# Patient Record
Sex: MALE | Race: WHITE | NOT HISPANIC OR LATINO | Employment: PART TIME | URBAN - METROPOLITAN AREA
[De-identification: names, ages, dates, MRNs, and addresses within clinical notes are randomized per-mention and may not be internally consistent; named-entity substitution may affect disease eponyms.]

---

## 2017-08-09 ENCOUNTER — OFFICE VISIT (OUTPATIENT)
Dept: FAMILY MEDICINE | Facility: CLINIC | Age: 22
End: 2017-08-09
Payer: COMMERCIAL

## 2017-08-09 ENCOUNTER — LAB VISIT (OUTPATIENT)
Dept: LAB | Facility: HOSPITAL | Age: 22
End: 2017-08-09
Attending: INTERNAL MEDICINE
Payer: COMMERCIAL

## 2017-08-09 VITALS
SYSTOLIC BLOOD PRESSURE: 110 MMHG | HEART RATE: 61 BPM | WEIGHT: 226.06 LBS | TEMPERATURE: 97 F | OXYGEN SATURATION: 98 % | BODY MASS INDEX: 29.96 KG/M2 | DIASTOLIC BLOOD PRESSURE: 80 MMHG | HEIGHT: 73 IN

## 2017-08-09 DIAGNOSIS — R82.998 DARK URINE: ICD-10-CM

## 2017-08-09 DIAGNOSIS — J02.9 ACUTE PHARYNGITIS, UNSPECIFIED ETIOLOGY: Primary | ICD-10-CM

## 2017-08-09 LAB
BASOPHILS # BLD AUTO: 0.03 K/UL
BASOPHILS NFR BLD: 0.4 %
DIFFERENTIAL METHOD: NORMAL
EOSINOPHIL # BLD AUTO: 0.3 K/UL
EOSINOPHIL NFR BLD: 3.6 %
ERYTHROCYTE [DISTWIDTH] IN BLOOD BY AUTOMATED COUNT: 12.6 %
HCT VFR BLD AUTO: 45.5 %
HGB BLD-MCNC: 15.9 G/DL
LYMPHOCYTES # BLD AUTO: 2.3 K/UL
LYMPHOCYTES NFR BLD: 29.1 %
MCH RBC QN AUTO: 30.2 PG
MCHC RBC AUTO-ENTMCNC: 34.9 G/DL
MCV RBC AUTO: 87 FL
MONOCYTES # BLD AUTO: 0.9 K/UL
MONOCYTES NFR BLD: 11.8 %
NEUTROPHILS # BLD AUTO: 4.4 K/UL
NEUTROPHILS NFR BLD: 55 %
PLATELET # BLD AUTO: 242 K/UL
PMV BLD AUTO: 10.7 FL
RBC # BLD AUTO: 5.26 M/UL
WBC # BLD AUTO: 7.96 K/UL

## 2017-08-09 PROCEDURE — 3008F BODY MASS INDEX DOCD: CPT | Mod: S$GLB,,, | Performed by: FAMILY MEDICINE

## 2017-08-09 PROCEDURE — 85025 COMPLETE CBC W/AUTO DIFF WBC: CPT

## 2017-08-09 PROCEDURE — 99213 OFFICE O/P EST LOW 20 MIN: CPT | Mod: S$GLB,,, | Performed by: FAMILY MEDICINE

## 2017-08-09 PROCEDURE — 36415 COLL VENOUS BLD VENIPUNCTURE: CPT | Mod: PO

## 2017-08-09 PROCEDURE — 99999 PR PBB SHADOW E&M-EST. PATIENT-LVL III: CPT | Mod: PBBFAC,,, | Performed by: FAMILY MEDICINE

## 2017-08-09 RX ORDER — FLUTICASONE PROPIONATE 50 MCG
SPRAY, SUSPENSION (ML) NASAL
Qty: 16 G | Refills: 5 | Status: SHIPPED | OUTPATIENT
Start: 2017-08-09

## 2017-08-16 ENCOUNTER — OFFICE VISIT (OUTPATIENT)
Dept: INTERNAL MEDICINE | Facility: CLINIC | Age: 22
End: 2017-08-16
Payer: COMMERCIAL

## 2017-08-16 ENCOUNTER — LAB VISIT (OUTPATIENT)
Dept: LAB | Facility: HOSPITAL | Age: 22
End: 2017-08-16
Attending: INTERNAL MEDICINE
Payer: COMMERCIAL

## 2017-08-16 VITALS
DIASTOLIC BLOOD PRESSURE: 86 MMHG | OXYGEN SATURATION: 96 % | HEART RATE: 70 BPM | BODY MASS INDEX: 30.51 KG/M2 | SYSTOLIC BLOOD PRESSURE: 118 MMHG | TEMPERATURE: 97 F | WEIGHT: 230.19 LBS | HEIGHT: 73 IN

## 2017-08-16 DIAGNOSIS — Z00.00 ROUTINE GENERAL MEDICAL EXAMINATION AT A HEALTH CARE FACILITY: Primary | ICD-10-CM

## 2017-08-16 DIAGNOSIS — Z00.00 ROUTINE GENERAL MEDICAL EXAMINATION AT A HEALTH CARE FACILITY: ICD-10-CM

## 2017-08-16 LAB
ALBUMIN SERPL BCP-MCNC: 4.2 G/DL
ALP SERPL-CCNC: 73 U/L
ALT SERPL W/O P-5'-P-CCNC: 51 U/L
ANION GAP SERPL CALC-SCNC: 11 MMOL/L
AST SERPL-CCNC: 42 U/L
BILIRUB SERPL-MCNC: 1 MG/DL
BILIRUB UR QL STRIP: NEGATIVE
BUN SERPL-MCNC: 18 MG/DL
CALCIUM SERPL-MCNC: 9.6 MG/DL
CHLORIDE SERPL-SCNC: 102 MMOL/L
CHOLEST/HDLC SERPL: 2.6 {RATIO}
CLARITY UR REFRACT.AUTO: CLEAR
CO2 SERPL-SCNC: 28 MMOL/L
COLOR UR AUTO: YELLOW
CREAT SERPL-MCNC: 1.1 MG/DL
EST. GFR  (AFRICAN AMERICAN): >60 ML/MIN/1.73 M^2
EST. GFR  (NON AFRICAN AMERICAN): >60 ML/MIN/1.73 M^2
GLUCOSE SERPL-MCNC: 69 MG/DL
GLUCOSE UR QL STRIP: NEGATIVE
HDL/CHOLESTEROL RATIO: 37.8 %
HDLC SERPL-MCNC: 156 MG/DL
HDLC SERPL-MCNC: 59 MG/DL
HGB UR QL STRIP: NEGATIVE
KETONES UR QL STRIP: NEGATIVE
LDLC SERPL CALC-MCNC: 73.4 MG/DL
LEUKOCYTE ESTERASE UR QL STRIP: NEGATIVE
NITRITE UR QL STRIP: NEGATIVE
NONHDLC SERPL-MCNC: 97 MG/DL
PH UR STRIP: 5 [PH] (ref 5–8)
POTASSIUM SERPL-SCNC: 4.6 MMOL/L
PROT SERPL-MCNC: 7.7 G/DL
PROT UR QL STRIP: NEGATIVE
SODIUM SERPL-SCNC: 141 MMOL/L
SP GR UR STRIP: 1.02 (ref 1–1.03)
TRIGL SERPL-MCNC: 118 MG/DL
TSH SERPL DL<=0.005 MIU/L-ACNC: 1 UIU/ML
URN SPEC COLLECT METH UR: NORMAL
UROBILINOGEN UR STRIP-ACNC: NEGATIVE EU/DL

## 2017-08-16 PROCEDURE — 84443 ASSAY THYROID STIM HORMONE: CPT

## 2017-08-16 PROCEDURE — 36415 COLL VENOUS BLD VENIPUNCTURE: CPT | Mod: PO

## 2017-08-16 PROCEDURE — 80061 LIPID PANEL: CPT

## 2017-08-16 PROCEDURE — 99999 PR PBB SHADOW E&M-EST. PATIENT-LVL III: CPT | Mod: PBBFAC,,, | Performed by: INTERNAL MEDICINE

## 2017-08-16 PROCEDURE — 81003 URINALYSIS AUTO W/O SCOPE: CPT

## 2017-08-16 PROCEDURE — 80053 COMPREHEN METABOLIC PANEL: CPT

## 2017-08-16 PROCEDURE — 99395 PREV VISIT EST AGE 18-39: CPT | Mod: S$GLB,,, | Performed by: INTERNAL MEDICINE

## 2017-08-16 NOTE — PROGRESS NOTES
"HPI:  Patient is a 22-year-old man who comes in today for his initial visit myself to establish care.  1 year ago he was seen for dark-colored urine any urinalysis showed increased protein and bilirubin.  Subsequent lab work was all normal.  He would like to have all that repeated.  He's feeling fine.  He has no complaints at all.      Current MEDS: medcard review, verified and update  Allergies: Per the electronic medical record    Past Medical History:   Diagnosis Date    Asthma     Eustachian tube dysfunction 7/12/2013       No past surgical history on file.    SHx: per the electronic medical record    FHx: recorded in the electronic medical record    ROS:    denies any chest pains or shortness of breath. Denies any nausea, vomiting or diarrhea. Denies any fever, chills or sweats. Denies any change in weight, voice, stool, skin or hair. Denies any dysuria, dyspepsia or dysphagia. Denies any change in vision, hearing or headaches. Denies any swollen lymph nodes or loss of memory.    PE:  /86   Pulse 70   Temp 96.8 °F (36 °C) (Tympanic)   Ht 6' 1" (1.854 m)   Wt 104.4 kg (230 lb 2.6 oz)   SpO2 96%   BMI 30.37 kg/m²   Gen: Well-developed, well-nourished, male, in no acute distress, oriented x3  HEENT: neck is supple, no adenopathy, carotids 2+ equal without bruits, thyroid exam normal size without nodules.  CHEST: clear to auscultation and percussion  CVS: regular rate and rhythm without significant murmur, gallop, or rubs  ABD: soft, benign, no rebound no guarding, no distention.  Bowel sounds are normal.     nontender.  No palpable masses.  No organomegaly and no audible bruits.  RECTAL: Deferred  EXT: no clubbing, cyanosis, or edema  LYMPH: no cervical, inguinal, or axillary adenopathy  FEET: no loss of sensation.  No ulcers or pressure sores.  NEURO: gait normal.  Cranial nerves II- XII intact. No nystagmus.  Speech normal.   Gross motor and sensory unremarkable.    Lab Results   Component Value " Date    WBC 7.96 08/09/2017    HGB 15.9 08/09/2017    HCT 45.5 08/09/2017     08/09/2017    CHOL 111 (L) 09/09/2016    TRIG 81 09/09/2016    HDL 40 09/09/2016    ALT 26 09/09/2016    AST 27 09/09/2016     09/09/2016    K 4.5 09/09/2016     09/09/2016    CREATININE 1.2 09/09/2016    BUN 9 09/09/2016    CO2 29 09/09/2016    TSH 0.619 09/09/2016    HGBA1C 4.8 09/09/2016       Impression:  Healthy appearing 22-year-old male  Patient Active Problem List   Diagnosis    Eustachian tube dysfunction    Proteinuria       Plan:   Orders Placed This Encounter    Comprehensive metabolic panel    Lipid panel    TSH    Urinalysis     He will have the above lab work and urine test today.  We will call him with results.

## 2017-08-18 ENCOUNTER — TELEPHONE (OUTPATIENT)
Dept: INTERNAL MEDICINE | Facility: CLINIC | Age: 22
End: 2017-08-18

## 2017-08-18 DIAGNOSIS — R79.89 ABNORMAL LFTS: Primary | ICD-10-CM

## 2017-08-18 NOTE — TELEPHONE ENCOUNTER
Your urine test was completely normal but your lab work shows very mild elevation in two of your liver blood test. I want you to have some additional lab work and an ultrasound of your liver.     Orders in/see me five days later

## 2017-08-23 ENCOUNTER — TELEPHONE (OUTPATIENT)
Dept: INTERNAL MEDICINE | Facility: CLINIC | Age: 22
End: 2017-08-23

## 2017-08-23 NOTE — TELEPHONE ENCOUNTER
Called pt informed him of results and recommendations.  Pt voiced understanding and scheduled lab and ultra sound appointments.  He will call back to schedule follow up when he checks his work schedule.

## 2017-09-14 ENCOUNTER — TELEPHONE (OUTPATIENT)
Dept: RADIOLOGY | Facility: HOSPITAL | Age: 22
End: 2017-09-14

## 2017-09-15 ENCOUNTER — PATIENT MESSAGE (OUTPATIENT)
Dept: INTERNAL MEDICINE | Facility: CLINIC | Age: 22
End: 2017-09-15

## 2017-09-15 ENCOUNTER — HOSPITAL ENCOUNTER (OUTPATIENT)
Dept: RADIOLOGY | Facility: HOSPITAL | Age: 22
Discharge: HOME | End: 2017-09-15
Attending: INTERNAL MEDICINE
Payer: COMMERCIAL

## 2017-09-15 DIAGNOSIS — R79.89 ABNORMAL LFTS: ICD-10-CM

## 2017-09-15 PROCEDURE — 76705 ECHO EXAM OF ABDOMEN: CPT | Mod: 26,,, | Performed by: RADIOLOGY

## 2017-09-15 PROCEDURE — 76705 ECHO EXAM OF ABDOMEN: CPT | Mod: TC,PO

## 2017-12-13 ENCOUNTER — TELEPHONE (OUTPATIENT)
Dept: INTERNAL MEDICINE | Facility: CLINIC | Age: 22
End: 2017-12-13

## 2017-12-14 ENCOUNTER — OFFICE VISIT (OUTPATIENT)
Dept: INTERNAL MEDICINE | Facility: CLINIC | Age: 22
End: 2017-12-14
Payer: COMMERCIAL

## 2017-12-14 ENCOUNTER — PATIENT MESSAGE (OUTPATIENT)
Dept: INTERNAL MEDICINE | Facility: CLINIC | Age: 22
End: 2017-12-14

## 2017-12-14 VITALS
SYSTOLIC BLOOD PRESSURE: 120 MMHG | HEART RATE: 74 BPM | OXYGEN SATURATION: 98 % | HEIGHT: 73 IN | DIASTOLIC BLOOD PRESSURE: 80 MMHG | WEIGHT: 232.38 LBS | BODY MASS INDEX: 30.8 KG/M2 | TEMPERATURE: 97 F

## 2017-12-14 DIAGNOSIS — K76.0 FATTY LIVER: ICD-10-CM

## 2017-12-14 DIAGNOSIS — H60.501 ACUTE OTITIS EXTERNA OF RIGHT EAR, UNSPECIFIED TYPE: Primary | ICD-10-CM

## 2017-12-14 PROCEDURE — 99999 PR PBB SHADOW E&M-EST. PATIENT-LVL III: CPT | Mod: PBBFAC,,, | Performed by: NURSE PRACTITIONER

## 2017-12-14 PROCEDURE — 99213 OFFICE O/P EST LOW 20 MIN: CPT | Mod: S$GLB,,, | Performed by: NURSE PRACTITIONER

## 2017-12-14 RX ORDER — NEOMYCIN SULFATE, POLYMYXIN B SULFATE, HYDROCORTISONE 3.5; 10000; 1 MG/ML; [USP'U]/ML; MG/ML
3 SOLUTION/ DROPS AURICULAR (OTIC) 3 TIMES DAILY
Qty: 10 ML | Refills: 0 | Status: SHIPPED | OUTPATIENT
Start: 2017-12-14 | End: 2018-08-27

## 2017-12-14 NOTE — PROGRESS NOTES
"Subjective:      Patient ID: Thang Ornelas is a 22 y.o. male.    Chief Complaint: Otalgia    HPI:  Patient is here with complaints of right ear pain.  Says he frequently has ear pain/infection requiring ear drops.  He denies any discharge from the ear, no fever, no recent swimming.  Tries to dry ears well after each shower.  Denies any recent cold or illness.   He was also informed from Dr Silva that all of his testing is showing a fatty liver, everything else was found to be normal.  Advised to lose weight, stay on the thin side.    Past Medical History:   Diagnosis Date    Asthma     Eustachian tube dysfunction 7/12/2013       History reviewed. No pertinent surgical history.    Lab Results   Component Value Date    WBC 7.96 08/09/2017    HGB 15.9 08/09/2017    HCT 45.5 08/09/2017     08/09/2017    CHOL 156 08/16/2017    TRIG 118 08/16/2017    HDL 59 08/16/2017    ALT 51 (H) 08/16/2017    AST 42 (H) 08/16/2017     08/16/2017    K 4.6 08/16/2017     08/16/2017    CREATININE 1.1 08/16/2017    BUN 18 08/16/2017    CO2 28 08/16/2017    TSH 0.999 08/16/2017    HGBA1C 4.8 09/09/2016       /80 (BP Location: Left arm, Patient Position: Sitting, BP Method: X-Large (Manual))   Pulse 74   Temp 97.4 °F (36.3 °C) (Tympanic)   Ht 6' 1" (1.854 m)   Wt 105.4 kg (232 lb 5.8 oz)   SpO2 98%   BMI 30.66 kg/m²       Review of Systems   Constitutional: Negative for appetite change, chills, diaphoresis and fever.   HENT: Positive for ear pain. Negative for congestion, postnasal drip, rhinorrhea, sneezing, sore throat and trouble swallowing.    Eyes: Negative for photophobia, pain and visual disturbance.   Respiratory: Negative for apnea, cough, choking, chest tightness, shortness of breath and wheezing.    Cardiovascular: Negative for chest pain, palpitations and leg swelling.   Gastrointestinal: Negative for abdominal pain, constipation, diarrhea, nausea and vomiting.   Genitourinary: Negative for " decreased urine volume, difficulty urinating, dysuria, hematuria and urgency.   Musculoskeletal: Negative for arthralgias, gait problem, joint swelling and myalgias.   Skin: Negative for rash.   Neurological: Negative for dizziness, tremors, seizures, syncope, weakness, light-headedness, numbness and headaches.   Psychiatric/Behavioral: Negative for agitation, confusion, decreased concentration, hallucinations and sleep disturbance. The patient is not nervous/anxious.       Objective:     Physical Exam   Constitutional: He is oriented to person, place, and time. He appears well-developed and well-nourished. No distress.   HENT:   Left TM normal  Right TM minimal redness, mild swelling, no bulging   Musculoskeletal:   Normal gait   Neurological: He is alert and oriented to person, place, and time.   Skin: Skin is warm and dry.   Psychiatric: He has a normal mood and affect. His behavior is normal.     Assessment:      1. Acute otitis externa of right ear, unspecified type    2. Fatty liver      Plan:   Acute otitis externa of right ear, unspecified type    Fatty liver    Other orders  -     neomycin-polymyxin-hydrocortisone (CORTISPORIN) otic solution; Place 3 drops into the right ear 3 (three) times daily.  Dispense: 10 mL; Refill: 0    will use Flonse daily until ear is better.  LMK if not better next week      Current Outpatient Prescriptions:     cetirizine (ZYRTEC) 10 MG tablet, Take 10 mg by mouth daily as needed. , Disp: , Rfl:     fluticasone (FLONASE) 50 mcg/actuation nasal spray, USE 1 SPRAY IN EACH NOSTRIL EVERY DAY, Disp: 16 g, Rfl: 5    neomycin-polymyxin-hydrocortisone (CORTISPORIN) otic solution, Place 3 drops into the right ear 3 (three) times daily., Disp: 10 mL, Rfl: 0

## 2018-08-27 ENCOUNTER — OFFICE VISIT (OUTPATIENT)
Dept: INTERNAL MEDICINE | Facility: CLINIC | Age: 23
End: 2018-08-27
Payer: COMMERCIAL

## 2018-08-27 VITALS
BODY MASS INDEX: 31.44 KG/M2 | HEIGHT: 73 IN | RESPIRATION RATE: 18 BRPM | HEART RATE: 76 BPM | DIASTOLIC BLOOD PRESSURE: 98 MMHG | SYSTOLIC BLOOD PRESSURE: 122 MMHG | TEMPERATURE: 99 F | OXYGEN SATURATION: 99 % | WEIGHT: 237.19 LBS

## 2018-08-27 DIAGNOSIS — R51.9 NONINTRACTABLE HEADACHE, UNSPECIFIED CHRONICITY PATTERN, UNSPECIFIED HEADACHE TYPE: Primary | ICD-10-CM

## 2018-08-27 PROCEDURE — 3008F BODY MASS INDEX DOCD: CPT | Mod: CPTII,S$GLB,, | Performed by: INTERNAL MEDICINE

## 2018-08-27 PROCEDURE — 99999 PR PBB SHADOW E&M-EST. PATIENT-LVL III: CPT | Mod: PBBFAC,,, | Performed by: INTERNAL MEDICINE

## 2018-08-27 PROCEDURE — 99213 OFFICE O/P EST LOW 20 MIN: CPT | Mod: S$GLB,,, | Performed by: INTERNAL MEDICINE

## 2018-08-27 NOTE — PROGRESS NOTES
"HPI:  Patient is a 23-year-old man who comes today with complaints of having headaches for the last week whenever he is working now are having sex.  He does a lot of muscle strength training and body building.  The last week whenever he is exercises and exert himself is when the headache begins.  It is a left-sided throbbing pain.  He takes ibuprofen for it.  It can last for several hours.  He has also had it with intercourse.  Prior to this past week he has had no other problems with it.  He denies any significant exposure to mosquitos.  Patient has had no fever or other symptoms.  He has had no nausea vomiting.    Current meds have been verified and updated per the EMR  Exam:BP (!) 122/98   Pulse 76   Temp 98.7 °F (37.1 °C)   Resp 18   Ht 6' 0.84" (1.85 m)   Wt 107.6 kg (237 lb 3.4 oz)   SpO2 99%   BMI 31.44 kg/m²   His fundi are unremarkable.  Speech is normal.  Gait normal motor and sensory exam is grossly normal.  Neck is supple without adenopathy.    Lab Results   Component Value Date    WBC 7.96 08/09/2017    HGB 15.9 08/09/2017    HCT 45.5 08/09/2017     08/09/2017    CHOL 156 08/16/2017    TRIG 118 08/16/2017    HDL 59 08/16/2017    ALT 51 (H) 08/16/2017    AST 42 (H) 08/16/2017     08/16/2017    K 4.6 08/16/2017     08/16/2017    CREATININE 1.1 08/16/2017    BUN 18 08/16/2017    CO2 28 08/16/2017    TSH 0.999 08/16/2017    HGBA1C 4.8 09/09/2016       Impression:  Headache, only present for the last week.  Highly suspect just tension stress headache.  Elevated blood pressure for the 1st time  Patient Active Problem List   Diagnosis    Eustachian tube dysfunction    Proteinuria       Plan:     I recommended he try taking 4 ibuprofen 2 Tylenol with the onset of the headache.  If he still having headaches after 2 more weeks, he needs to let me know.  He needs to check his blood pressure 3 to 4 times in the next couple weeks in the a let me know if it is more than 130/80.    "

## 2018-10-21 ENCOUNTER — HOSPITAL ENCOUNTER (EMERGENCY)
Facility: HOSPITAL | Age: 23
Discharge: HOME | End: 2018-10-21
Attending: EMERGENCY MEDICINE
Payer: COMMERCIAL

## 2018-10-21 VITALS
BODY MASS INDEX: 30.48 KG/M2 | HEART RATE: 107 BPM | HEIGHT: 73 IN | OXYGEN SATURATION: 98 % | SYSTOLIC BLOOD PRESSURE: 187 MMHG | DIASTOLIC BLOOD PRESSURE: 79 MMHG | TEMPERATURE: 99 F | RESPIRATION RATE: 18 BRPM | WEIGHT: 230 LBS

## 2018-10-21 DIAGNOSIS — S99.922A FOOT INJURY, LEFT, INITIAL ENCOUNTER: ICD-10-CM

## 2018-10-21 DIAGNOSIS — R03.0 ELEVATED BLOOD PRESSURE READING: ICD-10-CM

## 2018-10-21 DIAGNOSIS — S92.345A CLOSED NONDISPLACED FRACTURE OF FOURTH METATARSAL BONE OF LEFT FOOT, INITIAL ENCOUNTER: Primary | ICD-10-CM

## 2018-10-21 PROCEDURE — 99283 EMERGENCY DEPT VISIT LOW MDM: CPT | Mod: 25

## 2018-10-21 PROCEDURE — 29515 APPLICATION SHORT LEG SPLINT: CPT

## 2018-10-21 RX ORDER — HYDROCODONE BITARTRATE AND ACETAMINOPHEN 7.5; 325 MG/1; MG/1
1 TABLET ORAL EVERY 8 HOURS PRN
Qty: 12 TABLET | Refills: 0 | Status: SHIPPED | OUTPATIENT
Start: 2018-10-21

## 2018-10-21 RX ORDER — IBUPROFEN 800 MG/1
800 TABLET ORAL EVERY 6 HOURS PRN
Qty: 20 TABLET | Refills: 0 | Status: SHIPPED | OUTPATIENT
Start: 2018-10-21

## 2018-10-21 NOTE — ED NOTES
Bed:  05  Expected date:   Expected time:   Means of arrival:   Comments:     Leonel Terrazas RN  10/21/18 0216

## 2018-10-21 NOTE — ED PROVIDER NOTES
HISTORY     Chief Complaint   Patient presents with    Foot Injury     Left foot injured playing yard football.  Top of foot is swollen, red, and painful.       Review of patient's allergies indicates:  No Known Allergies     HPI     Foot Injury    Incident location: Tailgating in parking lot. The injury mechanism was compression (Jumping up for football ). The incident occurred yesterday. The pain is present in the left foot. The quality of the pain is described as aching. The pain is at a severity of 7/10. The pain has been fluctuating since onset. Associated symptoms include inability to bear weight. The symptoms are aggravated by bearing weight and palpation. He has tried nothing for the symptoms.        PCP: Primary Doctor No     Past Medical History:  Past Medical History:   Diagnosis Date    Asthma     Eustachian tube dysfunction 7/12/2013        Past Surgical History:  No past surgical history on file.     Family History:  No family history on file.     Social History:  Social History     Tobacco Use    Smoking status: Never Smoker    Smokeless tobacco: Never Used   Substance and Sexual Activity    Alcohol use: Yes     Comment: socially    Drug use: No    Sexual activity: Yes     Partners: Female         ROS   Review of Systems   Constitutional: Negative for fever.   HENT: Negative for sore throat.    Respiratory: Negative for shortness of breath.    Cardiovascular: Negative for chest pain.   Gastrointestinal: Negative for nausea.   Genitourinary: Negative for dysuria.   Musculoskeletal: Negative for back pain.        + L foot pain   Skin: Negative for rash.   Neurological: Negative for weakness.   Hematological: Does not bruise/bleed easily.   All other systems reviewed and are negative.      PHYSICAL EXAM     Initial Vitals [10/21/18 0100]   BP Pulse Resp Temp SpO2   (!) 187/79 107 18 99.1 °F (37.3 °C) 98 %      MAP       --           Physical Exam    Constitutional: He appears well-developed  "and well-nourished. No distress.   HENT:   Head: Normocephalic and atraumatic.   Eyes: Conjunctivae and EOM are normal. Pupils are equal, round, and reactive to light.   Neck: Normal range of motion. Neck supple.   Cardiovascular: Normal rate and regular rhythm.   Pulmonary/Chest: Breath sounds normal. No respiratory distress. He has no wheezes. He has no rales.   Abdominal: Soft. Bowel sounds are normal.   Musculoskeletal: Normal range of motion.        Left foot: There is tenderness, bony tenderness and swelling. There is no deformity.        Feet:    Neurological: He is alert and oriented to person, place, and time.   Skin: Skin is warm and dry. Capillary refill takes less than 2 seconds. No rash noted.   Psychiatric: He has a normal mood and affect.          ED COURSE   Splint Application  Date/Time: 10/21/2018 2:55 AM  Performed by: Douglas Partida Jr., FNP  Authorized by: Smooth Shirley MD   Consent Done: Yes  Consent: Verbal consent obtained.  Consent given by: patient  Location details: left leg  Splint type: short leg  Supplies used: Ortho-Glass and cotton padding  Post-procedure: The splinted body part was neurovascularly unchanged following the procedure.  Patient tolerance: Patient tolerated the procedure well with no immediate complications        ED ONGOING VITALS:  Vitals:    10/21/18 0100   BP: (!) 187/79   Pulse: 107   Resp: 18   Temp: 99.1 °F (37.3 °C)   TempSrc: Oral   SpO2: 98%   Weight: 104.3 kg (230 lb)   Height: 6' 1" (1.854 m)         ABNORMAL LAB VALUES:  Labs Reviewed - No data to display      ALL LAB VALUES:        RADIOLOGY STUDIES:  Imaging Results          X-Ray Foot Complete Left (Final result)  Result time 10/21/18 07:56:54    Final result by Nba Younger MD (10/21/18 07:56:54)                 Impression:      Subtle fracture through the base of the 4th metatarsal bone.      Electronically signed by: Nba Younger MD  Date:    10/21/2018  Time:    07:56             Narrative: "    EXAMINATION:  XR FOOT COMPLETE 3 VIEW LEFT    CLINICAL HISTORY:  .  Unspecified injury of left foot, initial encounter    TECHNIQUE:  AP, lateral and oblique views of the left foot were performed.    COMPARISON:  None.    FINDINGS:  Subtle nondisplaced incomplete fracture of the base of the 4th metatarsal bone.                              Type of Interpretation: ED Physician (Independently Interpreted).  Radiology Procedure Done: Left Foot.  Interpretation: 4th proximal metacarpal fracture non-displaced                The above vital signs and test results have been reviewed by the emergency provider.     ED Medications:  Medications - No data to display    Discharge Medications:  This SmartLink is deprecated. Use [employer] instead to display the medication list for a patient.   Follow-up Information     [facility] In 3 days.    Specialty:  Orthopedics  Contact information:  [address] [place] [id number] [phone]  Additional information:  (off [facility]) 1st floor               2:58 AM: Reassessed pt at this time.  Pt states his condition has improved at this time. Discussed with pt all pertinent ED information and results. Discussed pt dx and plan of tx. Gave pt all f/u and return to the ED instructions. All questions and concerns were addressed at this time. Pt expresses understanding of information and instructions, and is comfortable with plan to discharge. Pt is stable for discharge.    Pre-hypertension/Hypertension: The pt has been informed that they may have pre-hypertension or hypertension based on a blood pressure reading in the ED. I recommend that the pt call the PCP listed on their discharge instructions or a physician of their choice this week to arrange f/u for further evaluation of possible pre-hypertension or hypertension.       MEDICAL DECISION MAKING                 CLINICAL IMPRESSION       ICD-10-CM ICD-9-CM   1. Closed nondisplaced fracture of  fourth metatarsal bone of left foot, initial encounter S92.345A 825.25   2. Foot injury, left, initial encounter S99.922A 959.7   3. Elevated blood pressure reading R03.0 796.2               Douglas Partida Jr., FNP  10/21/18 5060

## 2018-10-22 ENCOUNTER — TELEPHONE (OUTPATIENT)
Dept: ORTHOPEDICS | Facility: CLINIC | Age: 23
End: 2018-10-22

## 2018-10-22 NOTE — TELEPHONE ENCOUNTER
Spoke with patient and he states that he has an appointment with his orthopedic physician already. Understanding verbalized.

## 2018-10-22 NOTE — TELEPHONE ENCOUNTER
----- Message from Lois Turner sent at 10/22/2018  8:20 AM CDT -----  Contact: Pt mom  Pt states he went to the ER over the weekend because of an broken leg. Doctor in ER told pt to follow up with Orthopedics provider for appointment on Monday (today). Please contact pt to see if there is and opening or can be fit in today.

## 2023-03-16 NOTE — PROGRESS NOTES
"  Chief Complaint:    Chief Complaint   Patient presents with    Sore Throat       History of Present Illness:    Presents today with a 2 day history of sore throat congestion postnasal drip but denies any fever no trouble breathing or wheezing.  Automation that's not a strep throat.    ROS:  Review of Systems   Constitutional: Negative for appetite change, chills and fever.   HENT: Positive for postnasal drip, rhinorrhea and sore throat. Negative for congestion, ear discharge, ear pain, facial swelling, mouth sores, sinus pressure, sneezing, trouble swallowing and voice change.    Eyes: Negative for discharge, redness and itching.   Respiratory: Negative for cough, chest tightness, shortness of breath and wheezing.    Cardiovascular: Negative for chest pain.       Past Medical History:   Diagnosis Date    Allergy     Asthma     Eustachian tube dysfunction 7/12/2013    Otitis media        Social History:  Social History     Social History    Marital status: Single     Spouse name: N/A    Number of children: N/A    Years of education: N/A     Social History Main Topics    Smoking status: Never Smoker    Smokeless tobacco: Not on file    Alcohol use Yes    Drug use: No    Sexual activity: Yes     Partners: Female     Other Topics Concern    Not on file     Social History Narrative    No narrative on file       Family History:   family history is not on file.    Health Maintenance   Topic Date Due    Influenza Vaccine  08/01/2017    TETANUS VACCINE  02/21/2021    Lipid Panel  Completed       Physical Exam:    Vital Signs  Temp: 96.9 °F (36.1 °C)  Temp src: Tympanic  Pulse: 61  SpO2: 98 %  BP: 110/80  BP Location: Left arm  BP Method: Manual  Patient Position: Sitting  Pain Score:   4  Pain Loc: Throat  Height and Weight  Height: 6' 1" (185.4 cm)  Weight: 102.6 kg (226 lb 1.3 oz)  BSA (Calculated - sq m): 2.3 sq meters  BMI (Calculated): 29.9  Weight in (lb) to have BMI = 25: 189.1]    Body mass index " is 29.83 kg/m².    Physical Exam   Constitutional: He appears well-developed and well-nourished.   HENT:   Head: Normocephalic and atraumatic.   Right Ear: External ear normal. Tympanic membrane is not bulging.   Left Ear: External ear normal. Tympanic membrane is not bulging.   Nose: No rhinorrhea. Right sinus exhibits no maxillary sinus tenderness and no frontal sinus tenderness. Left sinus exhibits no maxillary sinus tenderness and no frontal sinus tenderness.   Mouth/Throat: Oropharynx is clear and moist and mucous membranes are normal. No posterior oropharyngeal erythema or tonsillar abscesses.   Eyes: Conjunctivae are normal. Pupils are equal, round, and reactive to light.   Neck: Normal range of motion.   Cardiovascular: Normal rate and normal heart sounds.    Pulmonary/Chest: Effort normal and breath sounds normal. No stridor. No respiratory distress. He has no wheezes. He has no rales. He exhibits no tenderness.   Abdominal: Soft. There is no tenderness.   Lymphadenopathy:     He has no cervical adenopathy.       Lab Results   Component Value Date    CHOL 111 (L) 09/09/2016    TRIG 81 09/09/2016    HDL 40 09/09/2016    TOTALCHOLEST 2.8 09/09/2016    NONHDLCHOL 71 09/09/2016       Lab Results   Component Value Date    HGBA1C 4.8 09/09/2016       Assessment:      ICD-10-CM ICD-9-CM   1. Acute pharyngitis, unspecified etiology J02.9 462         Plan:  Patient most likely has upper respiratory infection which is caused by a virus, explained to patient that most viral infection Will resolve uneventfully, and virus they do not respond to antibiotics.  If however the symptoms persist beyond 10 days and or If they get worse or she develops sinus pain on one side of the head, and green discharge, fever or trouble breathing that would be an indication for the use of antibiotics and the patient need to contact us at that time should that happen.  At this time it's okay to treat the symptoms.  Patient understood and  acknowledged.      No orders of the defined types were placed in this encounter.      Current Outpatient Prescriptions   Medication Sig Dispense Refill    cetirizine (ZYRTEC) 10 MG tablet Take 10 mg by mouth daily as needed.       fluticasone (FLONASE) 50 mcg/actuation nasal spray USE 1 SPRAY IN EACH NOSTRIL EVERY DAY 16 g 5     No current facility-administered medications for this visit.        Medications Discontinued During This Encounter   Medication Reason    montelukast (SINGULAIR) 10 mg tablet Duplicate Order    montelukast (SINGULAIR) 10 mg tablet Patient no longer taking    fluticasone (FLONASE) 50 mcg/actuation nasal spray Reorder       No Follow-up on file.      Dr Nicole Ragland MD    Disclaimer: This note is prepared using voice recognition system and as such is likely to have errors and is not proof read.   62